# Patient Record
Sex: MALE | Race: WHITE | Employment: FULL TIME | URBAN - METROPOLITAN AREA
[De-identification: names, ages, dates, MRNs, and addresses within clinical notes are randomized per-mention and may not be internally consistent; named-entity substitution may affect disease eponyms.]

---

## 2017-09-10 ENCOUNTER — APPOINTMENT (OUTPATIENT)
Dept: GENERAL RADIOLOGY | Age: 42
End: 2017-09-10
Payer: COMMERCIAL

## 2017-09-10 ENCOUNTER — HOSPITAL ENCOUNTER (EMERGENCY)
Age: 42
Discharge: HOME | End: 2017-09-10
Payer: COMMERCIAL

## 2017-09-10 VITALS
HEIGHT: 74 IN | BODY MASS INDEX: 32.08 KG/M2 | TEMPERATURE: 99 F | HEART RATE: 64 BPM | WEIGHT: 250 LBS | DIASTOLIC BLOOD PRESSURE: 75 MMHG | OXYGEN SATURATION: 97 % | RESPIRATION RATE: 16 BRPM | SYSTOLIC BLOOD PRESSURE: 123 MMHG

## 2017-09-10 DIAGNOSIS — S62.336A CLOSED DISPLACED FRACTURE OF NECK OF FIFTH METACARPAL BONE OF RIGHT HAND, INITIAL ENCOUNTER: Primary | ICD-10-CM

## 2017-09-10 PROCEDURE — 99284 EMERGENCY DEPT VISIT MOD MDM: CPT

## 2017-09-10 PROCEDURE — 73130 X-RAY EXAM OF HAND: CPT

## 2017-09-10 PROCEDURE — 29125 APPL SHORT ARM SPLINT STATIC: CPT

## 2017-09-10 RX ORDER — IBUPROFEN 600 MG/1
600 TABLET ORAL ONCE
Status: COMPLETED | OUTPATIENT
Start: 2017-09-10 | End: 2017-09-10

## 2017-09-10 NOTE — ED PROVIDER NOTES
Patient Seen in: THE MED[facility] Emergency Department In [place]    History   Patient presents with:  Upper Extremity Injury (musculoskeletal)    Stated Complaint: punched refrigerator, right hand pain    HPI    CHIEF COMPLAINT: Right hand pain     HISTORY OF reviewed from today and agreed except as otherwise stated in HPI.     Physical Exam   ED Triage Vitals [09/10/17 1019]  BP: 123/75  Pulse: 64  Resp: 16  Temp: 98.6 °F (37 °C)  Temp src: Oral  SpO2: 97 %  O2 Device: None (Room air)    Current:/75   Pul Course  ------------------------------------------------------------  MDM     40-year-old male with a boxer's fracture. He was placed in an ulnar gutter splint and instructed to follow-up with hand surgery tomorrow.   We did review the importance of prompt

## 2017-09-12 PROBLEM — S62.356D CLOSED NONDISPLACED FRACTURE OF SHAFT OF FIFTH METACARPAL BONE OF RIGHT HAND WITH ROUTINE HEALING, SUBSEQUENT ENCOUNTER: Status: ACTIVE | Noted: 2017-09-12

## 2017-10-13 PROBLEM — M79.644 PAIN OF FINGER OF RIGHT HAND: Status: ACTIVE | Noted: 2017-10-13

## 2018-06-02 ENCOUNTER — OFFICE VISIT (OUTPATIENT)
Dept: FAMILY MEDICINE CLINIC | Facility: CLINIC | Age: 43
End: 2018-06-02

## 2018-06-02 VITALS
BODY MASS INDEX: 32.08 KG/M2 | SYSTOLIC BLOOD PRESSURE: 120 MMHG | HEIGHT: 74 IN | OXYGEN SATURATION: 99 % | TEMPERATURE: 98 F | DIASTOLIC BLOOD PRESSURE: 78 MMHG | HEART RATE: 63 BPM | WEIGHT: 250 LBS | RESPIRATION RATE: 20 BRPM

## 2018-06-02 DIAGNOSIS — H65.02 ACUTE SEROUS OTITIS MEDIA OF LEFT EAR, RECURRENCE NOT SPECIFIED: Primary | ICD-10-CM

## 2018-06-02 PROCEDURE — 99202 OFFICE O/P NEW SF 15 MIN: CPT | Performed by: NURSE PRACTITIONER

## 2018-06-02 RX ORDER — CEFDINIR 300 MG/1
300 CAPSULE ORAL 2 TIMES DAILY
Qty: 20 CAPSULE | Refills: 0 | Status: SHIPPED | OUTPATIENT
Start: 2018-06-02 | End: 2018-06-12

## 2018-06-02 NOTE — PROGRESS NOTES
/  CHIEF COMPLAINT:   Patient presents with:  Ear Pain: Left ear, less hearing, s/s for 2-3 day. OTC ear drops      HPI:   Ayden Plummer is a 37year old male who presents to clinic today with complaints of left ear pain. Has had for 3  days.  Pain is EARS: bilateral tragus non tender with manipulation. External auditory canals clear. Right TM: grey, no bulging, no retraction, no effusion, bony landmarks visible. Left TM: + erythema, + bulging, no retraction,+ effusion, bony landmarks obscured.   NOSE: You have an infection of the middle ear, the space behind the eardrum. This is also called acute otitis media (AOM). Sometimes it is caused by the common cold.  This is because congestion can block the internal passage (eustachian tube) that drains fluid fr

## (undated) NOTE — ED AVS SNAPSHOT
[place] Span   MRN: [id number]    Department:  [address] in [place]   Date of Visit:  9/10/2017           Disclosure     Insurance plans vary and the physician(s) referred by the ER may not be covered by your plan.  Please cont If you have been prescribed any medication(s), please fill your prescription right away and begin taking the medication(s) as directed    If the emergency physician has read X-rays, these will be re-interpreted by a radiologist.  If there is a significant